# Patient Record
Sex: FEMALE | Race: BLACK OR AFRICAN AMERICAN | NOT HISPANIC OR LATINO | URBAN - METROPOLITAN AREA
[De-identification: names, ages, dates, MRNs, and addresses within clinical notes are randomized per-mention and may not be internally consistent; named-entity substitution may affect disease eponyms.]

---

## 2024-12-10 ENCOUNTER — OFFICE VISIT (OUTPATIENT)
Dept: URGENT CARE | Age: 54
End: 2024-12-10
Payer: COMMERCIAL

## 2024-12-10 VITALS
SYSTOLIC BLOOD PRESSURE: 132 MMHG | TEMPERATURE: 98.7 F | RESPIRATION RATE: 16 BRPM | HEART RATE: 86 BPM | DIASTOLIC BLOOD PRESSURE: 82 MMHG | OXYGEN SATURATION: 97 %

## 2024-12-10 DIAGNOSIS — J01.00 ACUTE NON-RECURRENT MAXILLARY SINUSITIS: ICD-10-CM

## 2024-12-10 DIAGNOSIS — R10.811 RIGHT UPPER QUADRANT ABDOMINAL TENDERNESS WITHOUT REBOUND TENDERNESS: Primary | ICD-10-CM

## 2024-12-10 DIAGNOSIS — H66.002 NON-RECURRENT ACUTE SUPPURATIVE OTITIS MEDIA OF LEFT EAR WITHOUT SPONTANEOUS RUPTURE OF TYMPANIC MEMBRANE: ICD-10-CM

## 2024-12-10 RX ORDER — DOXYCYCLINE 100 MG/1
100 CAPSULE ORAL 2 TIMES DAILY
Qty: 20 CAPSULE | Refills: 0 | Status: SHIPPED | OUTPATIENT
Start: 2024-12-10 | End: 2024-12-20

## 2024-12-10 ASSESSMENT — ENCOUNTER SYMPTOMS
COUGH: 1
HEADACHES: 1
FEVER: 1
CONSTIPATION: 1
RHINORRHEA: 1
ABDOMINAL PAIN: 1
SORE THROAT: 1
SINUS PRESSURE: 1

## 2024-12-10 NOTE — PATIENT INSTRUCTIONS
Please go to ED for your Abdominal pain   Do not smoke    After Discharge from ED,start taking your Antibiotics

## 2024-12-10 NOTE — LETTER
December 10, 2024     Patient: Eleno Clemens   YOB: 1970   Date of Visit: 12/10/2024       To Whom It May Concern:    Eleno Clemens was seen in my clinic on 12/10/2024 at 9:10 am. Please excuse Eleno for her absence from work on this day to make the appointment. She can return to work 12/13/2024.    If you have any questions or concerns, please don't hesitate to call.         Sincerely,         Tania Childers MD        CC: No Recipients

## 2024-12-10 NOTE — PROGRESS NOTES
Subjective   Patient ID: Eleno Clemens is a 54 y.o. female. They present today with a chief complaint of URI, Cough, Sore Throat, Nasal Congestion, Abdominal Pain, Fever, and Headache (Pt presents with cough, sore throat, URI symptoms and now stabbing pain on right side of her abdomin. ).    History of Present Illness    URI  Presenting symptoms: congestion, cough, fever, rhinorrhea and sore throat    Associated symptoms: headaches    Cough  Associated symptoms include a fever, headaches, rhinorrhea and a sore throat.   Sore Throat   Associated symptoms include abdominal pain, congestion, coughing and headaches.   Abdominal Pain  Associated symptoms include constipation, a fever and headaches.   Fever   Associated symptoms include abdominal pain, congestion, coughing, headaches and a sore throat.   Headache  Associated symptoms: abdominal pain, congestion, cough, fever, sinus pressure, sore throat and URI        Past Medical History  Allergies as of 12/10/2024 - Reviewed 12/10/2024   Allergen Reaction Noted    Penicillins Hives 10/14/2014       (Not in a hospital admission)       Past Medical History:   Diagnosis Date    Herpesviral infection of other urogenital tract 04/19/2018    Herpes genitalis in women    Other specified health status 08/29/2019    No pertinent past medical history       Past Surgical History:   Procedure Laterality Date    OTHER SURGICAL HISTORY  08/29/2019    Ectopic pregnancy removal with salpingectomy            Review of Systems  Review of Systems   Constitutional:  Positive for fever.   HENT:  Positive for congestion, rhinorrhea, sinus pressure and sore throat.    Respiratory:  Positive for cough.    Gastrointestinal:  Positive for abdominal pain and constipation.   Neurological:  Positive for headaches.                                  Objective    Vitals:    12/10/24 0844   BP: 132/82   BP Location: Left arm   Patient Position: Sitting   Pulse: 86   Resp: 16   Temp: 37.1 °C (98.7 °F)    SpO2: 97%     No LMP recorded (lmp unknown). Patient is premenopausal.    Physical Exam  Constitutional:       Appearance: Normal appearance.   HENT:      Left Ear: Tympanic membrane is erythematous.      Nose: Mucosal edema and congestion present.      Right Sinus: Maxillary sinus tenderness present.      Left Sinus: Maxillary sinus tenderness present.   Eyes:      Conjunctiva/sclera: Conjunctivae normal.   Neck:      Comments: No palpable LNs  Cardiovascular:      Rate and Rhythm: Normal rate and regular rhythm.   Pulmonary:      Effort: Pulmonary effort is normal.      Breath sounds: Normal breath sounds and air entry.   Abdominal:      General: Bowel sounds are normal.      Tenderness: There is abdominal tenderness in the right upper quadrant and periumbilical area.      Comments: Mild periumblical tenderness, moderate RUQ tenderness   Neurological:      Mental Status: She is alert.         Procedures    Point of Care Test & Imaging Results from this visit  No results found for this visit on 12/10/24.   No results found.    Diagnostic study results (if any) were reviewed by Tania Childers MD.    Assessment/Plan   Allergies, medications, history, and pertinent labs/EKGs/Imaging reviewed by Tania Childers MD.     Medical Decision Making      Orders and Diagnoses  There are no diagnoses linked to this encounter.    Medical Admin Record      Patient disposition: ED    Electronically signed by Tania Childers MD  9:28 AM

## 2025-06-06 ENCOUNTER — OFFICE VISIT (OUTPATIENT)
Dept: OBSTETRICS AND GYNECOLOGY | Facility: CLINIC | Age: 55
End: 2025-06-06
Payer: COMMERCIAL

## 2025-06-06 VITALS
BODY MASS INDEX: 31.28 KG/M2 | WEIGHT: 170 LBS | SYSTOLIC BLOOD PRESSURE: 140 MMHG | DIASTOLIC BLOOD PRESSURE: 76 MMHG | HEIGHT: 62 IN

## 2025-06-06 DIAGNOSIS — R39.9 UTI SYMPTOMS: ICD-10-CM

## 2025-06-06 DIAGNOSIS — N92.6 IRREGULAR MENSTRUAL CYCLE: ICD-10-CM

## 2025-06-06 DIAGNOSIS — Z71.6 ENCOUNTER FOR SMOKING CESSATION COUNSELING: ICD-10-CM

## 2025-06-06 DIAGNOSIS — Z01.419 WOMEN'S ANNUAL ROUTINE GYNECOLOGICAL EXAMINATION: Primary | ICD-10-CM

## 2025-06-06 DIAGNOSIS — Z11.3 SCREEN FOR STD (SEXUALLY TRANSMITTED DISEASE): ICD-10-CM

## 2025-06-06 DIAGNOSIS — Z12.31 ENCOUNTER FOR SCREENING MAMMOGRAM FOR MALIGNANT NEOPLASM OF BREAST: ICD-10-CM

## 2025-06-06 DIAGNOSIS — Z01.419 PAP TEST, AS PART OF ROUTINE GYNECOLOGICAL EXAMINATION: ICD-10-CM

## 2025-06-06 PROCEDURE — 99406 BEHAV CHNG SMOKING 3-10 MIN: CPT | Performed by: OBSTETRICS & GYNECOLOGY

## 2025-06-06 PROCEDURE — 3008F BODY MASS INDEX DOCD: CPT | Performed by: OBSTETRICS & GYNECOLOGY

## 2025-06-06 PROCEDURE — 99386 PREV VISIT NEW AGE 40-64: CPT | Performed by: OBSTETRICS & GYNECOLOGY

## 2025-06-06 PROCEDURE — 4004F PT TOBACCO SCREEN RCVD TLK: CPT | Performed by: OBSTETRICS & GYNECOLOGY

## 2025-06-06 PROCEDURE — 99396 PREV VISIT EST AGE 40-64: CPT | Performed by: OBSTETRICS & GYNECOLOGY

## 2025-06-06 RX ORDER — IBUPROFEN 200 MG
1 TABLET ORAL EVERY 24 HOURS
Qty: 30 PATCH | Refills: 3 | Status: SHIPPED | OUTPATIENT
Start: 2025-06-06 | End: 2025-07-06

## 2025-06-06 ASSESSMENT — COLUMBIA-SUICIDE SEVERITY RATING SCALE - C-SSRS
6. HAVE YOU EVER DONE ANYTHING, STARTED TO DO ANYTHING, OR PREPARED TO DO ANYTHING TO END YOUR LIFE?: NO
2. HAVE YOU ACTUALLY HAD ANY THOUGHTS OF KILLING YOURSELF?: NO
1. IN THE PAST MONTH, HAVE YOU WISHED YOU WERE DEAD OR WISHED YOU COULD GO TO SLEEP AND NOT WAKE UP?: NO

## 2025-06-06 ASSESSMENT — PATIENT HEALTH QUESTIONNAIRE - PHQ9
SUM OF ALL RESPONSES TO PHQ9 QUESTIONS 1 AND 2: 2
2. FEELING DOWN, DEPRESSED OR HOPELESS: MORE THAN HALF THE DAYS
1. LITTLE INTEREST OR PLEASURE IN DOING THINGS: NOT AT ALL

## 2025-06-06 NOTE — PROGRESS NOTES
"Eleno Clemens is a 54 y.o. female who is here for a routine exam. PCP = No primary care provider on file.  Here for annual exam no complaints.  Occasionally sexually active.  Occasional steady partner.  Still having occasional menses every 2 to 3 months.  No spotting.    Review of Systems  Negative    Physical Exam  Constitutional:       Appearance: Normal appearance.   Genitourinary:      Genitourinary Comments: External genitalia unremarkable  Vagina clear  Cervix closed uterus normal anteverted  Adnexa masses or tenderness  Perineum without lesions or swelling    Breast masses tenderness or nipple discharge, normal appearance   Breasts:     Breasts are soft.     Right: Normal.      Left: Normal.   HENT:      Head: Normocephalic.      Nose: Nose normal.   Eyes:      Pupils: Pupils are equal, round, and reactive to light.   Cardiovascular:      Rate and Rhythm: Normal rate and regular rhythm.   Pulmonary:      Effort: Pulmonary effort is normal.      Breath sounds: Normal breath sounds.   Abdominal:      General: Abdomen is flat. Bowel sounds are normal.      Palpations: Abdomen is soft.   Musculoskeletal:         General: Normal range of motion.      Cervical back: Normal range of motion and neck supple.   Neurological:      General: No focal deficit present.      Mental Status: She is alert.   Skin:     General: Skin is warm and dry.   Psychiatric:         Mood and Affect: Mood normal.         Behavior: Behavior normal.         Thought Content: Thought content normal.         Judgment: Judgment normal.   Vitals reviewed.     Objective   /76   Ht 1.575 m (5' 2\")   Wt 77.1 kg (170 lb)   LMP 2025 (Exact Date)   BMI 31.09 kg/m²   OB History          7    Para   1    Term   1            AB   6    Living   1         SAB   3    IAB        Ectopic   3    Multiple        Live Births   1                  GynHx:  Menarche/Menopause: 13  Periods are irregular, lasting 4 days.  Dysmenorrhea: " none.   Cyclic symptoms include none.    Social History     Substance and Sexual Activity   Sexual Activity Not Currently    Partners: Male    Birth control/protection: None     STIs: none    Substance:   Tobacco Use: High Risk (6/6/2025)    Patient History     Smoking Tobacco Use: Every Day     Smokeless Tobacco Use: Never     Passive Exposure: Not on file      Social History     Substance and Sexual Activity   Drug Use Never      Social History     Substance and Sexual Activity   Alcohol Use Yes    Comment: socially     Abuse: No  Depression Screen:   Negative    Past med hx and past surg hx reviewed       Assessment  Unremarkable annual GYN exam.  Patient occasionally sexually active steady partner.  Discussed diet exercise calcium and vitamin D.  Patient is still having cycles every 2 to 3 months has been going on for some time.  Will order LH FSH for baseline rule out postmenopausal bleeding  Mammogram ordered  Routine STD testing ordered  Discussed smoking cessation with patient.  3 to 5 minutes.  Patient is willing to try the patch.  Prescribed risks and benefits reviewed.  Return to office in 1 year or as needed.  Will reevaluate when results of lab testing is back.